# Patient Record
(demographics unavailable — no encounter records)

---

## 2024-10-10 NOTE — ASSESSMENT
[FreeTextEntry1] : Patient is a 28-year-old gentleman with past medical history of hypertension, hyperlipidemia, and recent admission at [facility] for pancreatitis.  Patient developed pancreatitis secondary to hypertriglyceridemia in which she was admitted to the medical ICU for some time.  Patient had last abdominal CT scan done September 19 which noted some fluid around the pancreas but no organized collection to suggest necrosis.  Patient was treated for his cholesterol and was discharged on Flagyl and Levaquin.  Patient was in touch with his infectious disease doctor who noted that he may stop the Flagyl secondary to some nausea.  Patient since last visit has been doing well. Diet advanced to low fat. He has been walking and is set to return to work Wednesday.   Pancreatitis from Elevated Triglycerides complicated by Necrosis  - His pain is controlled; he is tolerating diet which is now low fat - He follows nutrition now  - Continue low fat diet - Labs ordered, CT scan of abdomen with and without contrast ordered- Prior Auth needed- We need to check size of Necrosis  - Follow up October 24h- Telephone call

## 2024-10-10 NOTE — HISTORY OF PRESENT ILLNESS
[FreeTextEntry1] : Patient is a 28-year-old gentleman with past medical history of hypertension, hyperlipidemia, and recent admission at [facility] for pancreatitis.  Patient developed pancreatitis secondary to hypertriglyceridemia in which she was admitted to the medical ICU for some time.  Patient had last abdominal CT scan done September 19 which noted some fluid around the pancreas but no organized collection to suggest necrosis.  Patient was treated for his cholesterol and was discharged on Flagyl and Levaquin.  Patient was in touch with his infectious disease doctor who noted that he may stop the Flagyl secondary to some nausea.  Patient since last visit has been doing well. Diet advanced to low fat. He has been walking and is set to return to work Wednesday.

## 2024-10-10 NOTE — REASON FOR VISIT
[Home] : at home, [unfilled] , at the time of the visit. [Medical Office: ([facility])___] : at the medical office located in  [Verbal consent obtained from patient] : the patient, [unfilled] [Follow-up] : a follow-up of an existing diagnosis [FreeTextEntry4] : Jessica

## 2024-10-10 NOTE — REASON FOR VISIT
[Home] : at home, [unfilled] , at the time of the visit. [Medical Office: ([facility])___] : at the medical office located in  [Verbal consent obtained from patient] : the patient, [unfilled] [Follow-up] : a follow-up of an existing diagnosis [FreeTextEntry4] : Jessica

## 2024-10-10 NOTE — HISTORY OF PRESENT ILLNESS
[FreeTextEntry1] : Patient is a 28-year-old gentleman with past medical history of hypertension, hyperlipidemia, and recent admission at [facility] for pancreatitis.  Patient developed pancreatitis secondary to hypertriglyceridemia in which she was admitted to the medical ICU for some time.  Patient had last abdominal CT scan done September 19 which noted some fluid around the pancreas but no organized collection to suggest necrosis.  Patient was treated for his cholesterol and was discharged on Flagyl and Levaquin.  Patient was in touch with his infectious disease doctor who noted that he may stop the Flagyl secondary to some nausea.  Patient since last visit has been doing well. Diet advanced to low fat. He has been walking and is set to return to work Wednesday.

## 2024-10-10 NOTE — ASSESSMENT
[FreeTextEntry1] : Patient is a 28-year-old gentleman with past medical history of hypertension, hyperlipidemia, and recent admission at [facility] for pancreatitis.  Patient developed pancreatitis secondary to hypertriglyceridemia in which she was admitted to the medical ICU for some time.  Patient had last abdominal CT scan done September 19 which noted some fluid around the pancreas but no organized collection to suggest necrosis.  Patient was treated for his cholesterol and was discharged on Flagyl and Levaquin.  Patient was in touch with his infectious disease doctor who noted that he may stop the Flagyl secondary to some nausea.  Patient since last visit has been doing well. Diet advanced to low fat. He has been walking and is set to return to work Wednesday.   Pancreatitis from Elevated Triglycerides complicated by Necrosis  - His pain is controlled; he is tolerating diet which is now low fat - He follows nutrition now  - Continue low fat diet - Labs ordered, CT scan of abdomen with and without contrast ordered- Prior Auth needed- We need to check size of Necrosis  - Follow up October 24h- Telephone call

## 2024-10-14 NOTE — REASON FOR VISIT
[Initial Evaluation] : an initial evaluation [DM Type 2] : DM Type 2 [FreeTextEntry2] : Hospital follow up

## 2024-10-14 NOTE — HISTORY OF PRESENT ILLNESS
[FreeTextEntry1] : Mr. GARO WATKINS  Is  a 28 year  old male with hypertension, hyperlipidemia, and recent admission at [facility] for pancreatitis secondary to hypertriglyceridemia requiring plasmapheresis and ICU admission as well as possible pancreas necrosis on imaging    # type 2 DM , hypertriglyceridemia induced pancreatitis  - DKA requiring insulin drip A1c very high discharged on basaglar 24 units and lispro with meals  - since hospital discharge, he is on above regimen, not needing to take much lispro only if having carb which he sis limiting  - doing FS checks at least 4-5 times/ day and ranges are in low 100s now , occasional hypoglycemia  - Diet: low fat , low carb  -Exercise: no  HBa1c trend:    .prevention   Statin: fenofibrate 145 mg daily , lipitor 80 mg daily  ACE/ARB : lisinopril 30 mg daily  Eye examination: due  Neuropathy: no

## 2024-10-14 NOTE — ASSESSMENT
[Carbohydrate Consistent Diet] : carbohydrate consistent diet [Importance of Diet and Exercise] : importance of diet and exercise to improve glycemic control, achieve weight loss and improve cardiovascular health [Hypoglycemia Management] : hypoglycemia management [Self Monitoring of Blood Glucose] : self monitoring of blood glucose [Injection Technique, Storage, Sharps Disposal] : injection technique, storage, and sharps disposal [Retinopathy Screening] : Patient was referred to ophthalmology for retinopathy screening [Weight Loss] : weight loss [FreeTextEntry1] : Mr. GARO WATKINS Is a 28 year old male with hypertension, hyperlipidemia, and recent admission at [facility] for pancreatitis secondary to hypertriglyceridemia requiring plasmapheresis and ICU admission as well as possible pancreas necrosis on imaging   # type 2 DM , hypertriglyceridemia induced pancreatitis - DKA requiring insulin drip A1c very high discharged on basaglar 24 units and lispro with meals - since hospital discharge, he is on above regimen, not needing to take much lispro only if having carb which he sis limiting - doing FS checks at least 4-5 times/ day and ranges are in low 100s now , occasional hypoglycemia - will highly benefit from CGM , sent Dexcom , as need to check FS 4-5 times / day to take his insulin according to numbers  - continue Basaglar 24 units daily  - lispro 3-4 units TIDAC  - sent baqsimi for low BG  - monitor FS  - is off Creon now , no diarrhea , followed by GI  - monitor lipid panel on fenofibrate 145 mg daily , lipitor 80 mg daily BP acceptable, check ACR on lisinopril 30 mg daily and amlodipine  - eye checkup advised

## 2024-10-14 NOTE — REVIEW OF SYSTEMS
[Fatigue] : no fatigue [Recent Weight Gain (___ Lbs)] : no recent weight gain [Recent Weight Loss (___ Lbs)] : recent weight loss: [unfilled] lbs [As Noted in HPI] : as noted in HPI

## 2024-10-14 NOTE — PHYSICAL EXAM
[Alert] : alert [Obese] : obese [No Acute Distress] : no acute distress [No Proptosis] : no proptosis [No Lid Lag] : no lid lag [Thyroid Not Enlarged] : the thyroid was not enlarged [No Thyroid Nodules] : no palpable thyroid nodules [No Respiratory Distress] : no respiratory distress [No Accessory Muscle Use] : no accessory muscle use [Clear to Auscultation] : lungs were clear to auscultation bilaterally [Normal S1, S2] : normal S1 and S2 [No Murmurs] : no murmurs [Regular Rhythm] : with a regular rhythm [No Edema] : no peripheral edema [No Stigmata of Cushings Syndrome] : no stigmata of Cushings Syndrome [No Tremors] : no tremors [Oriented x3] : oriented to person, place, and time

## 2024-11-27 NOTE — REASON FOR VISIT
[Home] : at home, [unfilled] , at the time of the visit. [Medical Office: ([facility])___] : at the medical office located in  [Verbal consent obtained from patient] : the patient, [unfilled] [FreeTextEntry4] : Debra LENNON

## 2024-11-27 NOTE — ASSESSMENT
[FreeTextEntry1] : Patient is a 29-year-old gentleman with past medical history of hypertension, hyperlipidemia, and H/O Pancreatitis at [facility]. He presents for F/U post repeat CT scan of Abdomen and Pelvis.    Patient developed pancreatitis secondary to hypertriglyceridemia in which she was admitted to the medical ICU for some time. Patient had last abdominal CT scan done September 19 which noted some fluid around the pancreas but no organized collection to suggest necrosis. Patient was treated for his cholesterol and was discharged on Flagyl and Levaquin. Patient was in touch with his infectious disease doctor who noted that he may stop the Flagyl secondary to some nausea.   Patient since last visit continues to feel well without any complaints including no fevers, abd pain, vomiting, weight loss, etc.  He has been following a low fat diet. He has been walking and he returned to work on 10/16/24 without any difficulties.  Repeat CT scan of Abdomen and Pelvis done 11/23/24 showed loculated fluid collection surrounding the pancreas that is decreased in size but still spanning 17.6 cm in length.    H/O Pancreatitis with Necrosis Peripancreatic fluid collection - Results of repeat CT scan discussed - Recommended an EUS with drainage of the fluid; he consented to having this done. All questions were answered. - EUS with drainage to be scheduled in January (after the holidays); risks and benefits discussed - F/U after the procedure is done

## 2024-11-27 NOTE — ASSESSMENT
[FreeTextEntry1] : Patient is a 29-year-old gentleman with past medical history of hypertension, hyperlipidemia, and H/O Pancreatitis at [facility]. He presents for F/U post repeat CT scan of Abdomen and Pelvis.    Patient developed pancreatitis secondary to hypertriglyceridemia in which she was admitted to the medical ICU for some time. Patient had last abdominal CT scan done September 19 which noted some fluid around the pancreas but no organized collection to suggest necrosis. Patient was treated for his cholesterol and was discharged on Flagyl and Levaquin. Patient was in touch with his infectious disease doctor who noted that he may stop the Flagyl secondary to some nausea.   Patient since last visit continues to feel well without any complaints including no fevers, abd pain, vomiting, weight loss, etc.  He has been following a low fat diet. He has been walking and he returned to work on 10/16/24 without any difficulties.  Repeat CT scan of Abdomen and Pelvis done 11/23/24 showed loculated fluid collection surrounding the pancreas that is decreased in size but still spanning 17.6 cm in length.    H/O Pancreatitis with Necrosis Peripancreatic fluid collection - Results of repeat CT scan discussed - Recommended an EUS with drainage of the fluid; he consented to having this done. All questions were answered. - EUS with drainage to be scheduled in January (after the holidays); risks and benefits discussed - F/U after the procedure is done

## 2024-11-27 NOTE — REASON FOR VISIT
[Home] : at home, [unfilled] , at the time of the visit. [Medical Office: ([facility])___] : at the medical office located in  [Verbal consent obtained from patient] : the patient, [unfilled] [FreeTextEntry4] : Debra LENNON

## 2024-11-27 NOTE — HISTORY OF PRESENT ILLNESS
[FreeTextEntry1] : Patient is a 29-year-old gentleman with past medical history of hypertension, hyperlipidemia, and H/O Pancreatitis at [facility]. He presents for F/U post repeat CT scan of Abdomen and Pelvis.    Patient developed pancreatitis secondary to hypertriglyceridemia in which she was admitted to the medical ICU for some time. Patient had last abdominal CT scan done September 19 which noted some fluid around the pancreas but no organized collection to suggest necrosis. Patient was treated for his cholesterol and was discharged on Flagyl and Levaquin. Patient was in touch with his infectious disease doctor who noted that he may stop the Flagyl secondary to some nausea.   Patient since last visit continues to feel well without any complaints including no fevers, abd pain, vomiting, weight loss, etc.  He has been following a low fat diet. He has been walking and he returned to work on 10/16/24 without any difficulties.  Repeat CT scan of Abdomen and Pelvis done 11/23/24 showed loculated fluid collection surrounding the pancreas that is decreased in size but still spanning 17.6 cm in length.

## 2025-01-29 NOTE — ASSESSMENT
[Carbohydrate Consistent Diet] : carbohydrate consistent diet [Importance of Diet and Exercise] : importance of diet and exercise to improve glycemic control, achieve weight loss and improve cardiovascular health [Hypoglycemia Management] : hypoglycemia management [Self Monitoring of Blood Glucose] : self monitoring of blood glucose [Injection Technique, Storage, Sharps Disposal] : injection technique, storage, and sharps disposal [Retinopathy Screening] : Patient was referred to ophthalmology for retinopathy screening [Weight Loss] : weight loss [FreeTextEntry1] : Mr. GARO WATKINS Is a 28 year old male with hypertension, hyperlipidemia, and 9/2024 t admission at [facility] for pancreatitis secondary to hypertriglyceridemia requiring plasmapheresis and ICU admission as well as possible pancreas necrosis on imaging   # type 2 DM , hypertriglyceridemia induced pancreatitis - 9/2024: DKA requiring insulin drip A1c very high discharged on basaglar 24 units and lispro with meals - since hospital discharge, he is on above regimen, not needing to take much lispro only if having carb which he sis limiting - was doing FS checks at least 4-5 times/ day and ranges are in low 100s now now using CGM data reviewed GMI 6.7% - will highly benefit to continue use of CGM , sent Dexcom - continue Basaglar 24 units daily  - lispro 3-4 units TIDAC  - has baqsimi for low BG  - monitor FS  - is off Creon now , no diarrhea , followed by GI  - monitor lipid panel on fenofibrate 145 mg daily ,  stopped statin , will try pravastatin 20 mg daily  BP acceptable, ACR negative  on lisinopril 30 mg daily and amlodipine  - eye checkup advised

## 2025-01-29 NOTE — REVIEW OF SYSTEMS
[As Noted in HPI] : as noted in HPI [Fatigue] : no fatigue [Recent Weight Gain (___ Lbs)] : no recent weight gain [Recent Weight Loss (___ Lbs)] : no recent weight loss

## 2025-01-29 NOTE — DATA REVIEWED
[FreeTextEntry1] : in [facility]IE labs reviewed   1/2025: A1c 6.6%  Tg 165   glucose 106 crea 0.8   TSH 4.1  c peptid 3.9 ACR negative

## 2025-01-29 NOTE — HISTORY OF PRESENT ILLNESS
[FreeTextEntry1] : Mr. GARO WATKINS  Is  a 28 year  old male with hypertension, hyperlipidemia, and 9/2024admission at [facility] for pancreatitis secondary to hypertriglyceridemia requiring plasmapheresis and ICU admission as well as possible pancreas necrosis on imaging    # type 2 DM , hypertriglyceridemia induced pancreatitis  - DKA requiring insulin drip A1c very high discharged on basaglar 24 units and lispro with meals  - since hospital discharge, he is on above regimen, not needing to take much lispro only if having carb which he is limiting  - was doing FS checks at least 4-5 times/ day and ranges are in low 100s now now using dexcom and GMI 6.7% no hypoglycemia  - Diet: low fat , low carb  -Exercise: no  HBa1c trend: 6.6% 1/2025   .prevention   Statin: fenofibrate 145 mg daily , he stopped lipitor because of SE  ACE/ARB : lisinopril 30 mg daily  Eye examination: due  Neuropathy: no

## 2025-07-02 NOTE — HISTORY OF PRESENT ILLNESS
[FreeTextEntry1] : Patient is a 29-year-old gentleman with past medical history of hypertension, hyperlipidemia, and H/O Pancreatitis at [facility]. He presents for F/U post EUS.  Patient developed pancreatitis secondary to hypertriglyceridemia in which she was admitted to the medical ICU for some time. Patient had last abdominal CT scan done September 19 which noted some fluid around the pancreas but no organized collection to suggest necrosis. Patient status post endoscopic ultrasound done in February which was reassuring without a drainable collection.  Overall feels well sometimes he gets dull epigastric pain with fatty meals.  Patient has been actively trying to lose weight.  Patient without additional complaints currently.  [de-identified] : 2/21/25

## 2025-07-02 NOTE — PHYSICAL EXAM
[Alert] : alert [Normal Voice/Communication] : normal voice/communication [Healthy Appearing] : healthy appearing [No Acute Distress] : no acute distress [Sclera] : the sclera and conjunctiva were normal [Hearing Threshold Finger Rub Not [place]] : hearing was normal [Normal Lips/Gums] : the lips and gums were normal [Oropharynx] : the oropharynx was normal [Normal Appearance] : the appearance of the neck was normal [No Neck Mass] : no neck mass was observed [No Respiratory Distress] : no respiratory distress [No Acc Muscle Use] : no accessory muscle use [Respiration, Rhythm And Depth] : normal respiratory rhythm and effort [Auscultation Breath Sounds / Voice Sounds] : lungs were clear to auscultation bilaterally [Heart Rate And Rhythm] : heart rate was normal and rhythm regular [Normal S1, S2] : normal S1 and S2 [Murmurs] : no murmurs [Bowel Sounds] : normal bowel sounds [Abdomen Tenderness] : non-tender [No Masses] : no abdominal mass palpated [Abdomen Soft] : soft [] : no hepatosplenomegaly [Oriented To Time, Place, And Person] : oriented to person, place, and time

## 2025-07-02 NOTE — ASSESSMENT
[FreeTextEntry1] : Patient is a 29-year-old gentleman with past medical history of hypertension, hyperlipidemia, and H/O Pancreatitis at [facility]. He presents for F/U post EUS.  Patient developed pancreatitis secondary to hypertriglyceridemia in which she was admitted to the medical ICU for some time. Patient had last abdominal CT scan done September 19 which noted some fluid around the pancreas but no organized collection to suggest necrosis. Patient status post endoscopic ultrasound done in February which was reassuring without a drainable collection.  Overall feels well sometimes he gets dull epigastric pain with fatty meals.  Patient has been actively trying to lose weight.  Patient without additional complaints currently.   H/O Pancreatitis with Necrosis Peripancreatic fluid collection - EUS discussed with patient - Will perform MRI - Elastase, calprotectin ordered  - Trial of enzymes

## 2025-07-29 NOTE — ASSESSMENT
[Carbohydrate Consistent Diet] : carbohydrate consistent diet [Importance of Diet and Exercise] : importance of diet and exercise to improve glycemic control, achieve weight loss and improve cardiovascular health [Hypoglycemia Management] : hypoglycemia management [Self Monitoring of Blood Glucose] : self monitoring of blood glucose [Injection Technique, Storage, Sharps Disposal] : injection technique, storage, and sharps disposal [Retinopathy Screening] : Patient was referred to ophthalmology for retinopathy screening [Weight Loss] : weight loss [FreeTextEntry1] : Mr. GARO WATKINS Is a 29 year old male with hypertension, hyperlipidemia, and 9/2024 t admission at [facility] for pancreatitis secondary to hypertriglyceridemia requiring plasmapheresis and ICU admission as well as possible pancreas necrosis on imaging  # type 2 DM , hypertriglyceridemia induced pancreatitis - DKA requiring insulin drip / plasmapheresis A1c very high discharged on basaglar 24 units and lispro - 7/2025: A1c up to 7.6% - CGM reviewed am fasting higher then target , few spikes post meals but then recover - Increase Tresiba to 26 units daily - continue lispro 3-5 units with meals , aware of carbs and adding for carbs - was doing FS checks at least 4-5 times/ day , will benefit to continue use of CGM - Diet: low fat , low carb - is off Creon now , no diarrhea , followed by GI , has peripancreatic collections on recent MRI will follow up with dr brower  - monitor lipid panel on fenofibrate 145 mg daily , he was given statin by cardiology  BP acceptable, ACR negative  on lisinopril 30 mg daily and amlodipine  - eye checkup done  - discussed avoiding GLP1 and GLP/ GIP given sever pancreatitis he had

## 2025-07-29 NOTE — REVIEW OF SYSTEMS
[Fatigue] : no fatigue [Recent Weight Gain (___ Lbs)] : no recent weight gain [Recent Weight Loss (___ Lbs)] : no recent weight loss [As Noted in HPI] : as noted in HPI

## 2025-07-29 NOTE — REASON FOR VISIT
[Home] : at home, [unfilled] , at the time of the visit. [Medical Office: ([facility])___] : at the medical office located in  [Telephone (audio)] : This telephonic visit was provided via audio only technology. [Verbal consent obtained from patient] : the patient, [unfilled] [Follow - Up] : a follow-up visit [DM Type 2] : DM Type 2 [Other: _____] : [unfilled]

## 2025-07-29 NOTE — DATA REVIEWED
[FreeTextEntry1] : in [facility] labs reviewed   1/2025: A1c 6.6%  Tg 165   glucose 106 crea 0.8   TSH 4.1  c peptid 3.9 ACR negative  7/2025 A1c 7.5%  Tg 218   thyroid normal and lipase normal

## 2025-07-29 NOTE — HISTORY OF PRESENT ILLNESS
[FreeTextEntry1] : Mr. GARO WATKINS  Is  a 29 year  old male with hypertension, hyperlipidemia, and 9/2024 admission at [facility] for pancreatitis secondary to hypertriglyceridemia requiring plasmapheresis and ICU admission as well as possible pancreas necrosis on imaging    # type 2 DM , hypertriglyceridemia induced pancreatitis  - DKA requiring insulin drip A1c very high discharged on basaglar 24 units and lispro  - 7/2025: A1c up to 7.6%  - CGM reviewed am fasting higher then target , few spikes post meals but then recover  - Increase Tresiba to 26 units daily  - continue lispro 3-5 units with meals , aware of carbs and adding for carbs  - was doing FS checks at least 4-5 times/ day  , will benefit to continue use of CGM  - Diet: low fat , low carb     .prevention   Statin: fenofibrate 145 mg daily , he stopped lipitor because of SE saw cardiology and adding now rosuvastatin  ACE/ARB : lisinopril 30 mg daily  Eye examination: done  Neuropathy: no